# Patient Record
Sex: MALE | Race: OTHER | Employment: OTHER | URBAN - METROPOLITAN AREA
[De-identification: names, ages, dates, MRNs, and addresses within clinical notes are randomized per-mention and may not be internally consistent; named-entity substitution may affect disease eponyms.]

---

## 2018-10-29 ENCOUNTER — NEW PATIENT EMERGENCY (OUTPATIENT)
Dept: URBAN - METROPOLITAN AREA CLINIC 35 | Facility: CLINIC | Age: 78
End: 2018-10-29

## 2018-10-29 DIAGNOSIS — H10.45: ICD-10-CM

## 2018-10-29 DIAGNOSIS — H04.123: ICD-10-CM

## 2018-10-29 PROCEDURE — G8427 DOCREV CUR MEDS BY ELIG CLIN: HCPCS

## 2018-10-29 PROCEDURE — 4040F PNEUMOC VAC/ADMIN/RCVD: CPT

## 2018-10-29 PROCEDURE — 99203 OFFICE O/P NEW LOW 30 MIN: CPT

## 2018-10-29 PROCEDURE — G8482 FLU IMMUNIZE ORDER/ADMIN: HCPCS

## 2018-10-29 ASSESSMENT — VISUAL ACUITY
OD_CC: 20/50-2
OS_CC: 20/40

## 2018-10-29 ASSESSMENT — TONOMETRY
OS_IOP_MMHG: 10
OD_IOP_MMHG: 10

## 2022-03-07 NOTE — PATIENT DISCUSSION
Pt refused to have an IV line. Ferlicet IV not given and Dr Mendieta is aware. Recommended observation.  pt does NOT want glasses or contact lenses.

## 2023-01-12 NOTE — PATIENT DISCUSSION
1/12/2023:  new after recent fall.  possibly may resolve after SIG [address] resolves.  if NI may need to verify that had CT or ORBITS (pt not sure says had CT scan but not sure if was of head only).   if has true decomp esop (def esop today) then may need CELIO prism.  NO diplopia in the morning but is getting worse as day goes on.